# Patient Record
Sex: MALE | Race: WHITE | Employment: FULL TIME | URBAN - METROPOLITAN AREA
[De-identification: names, ages, dates, MRNs, and addresses within clinical notes are randomized per-mention and may not be internally consistent; named-entity substitution may affect disease eponyms.]

---

## 2017-01-09 ENCOUNTER — OFFICE VISIT (OUTPATIENT)
Dept: SURGERY | Facility: CLINIC | Age: 47
End: 2017-01-09
Payer: COMMERCIAL

## 2017-01-09 VITALS
OXYGEN SATURATION: 98 % | SYSTOLIC BLOOD PRESSURE: 140 MMHG | TEMPERATURE: 98.3 F | HEART RATE: 78 BPM | DIASTOLIC BLOOD PRESSURE: 90 MMHG | WEIGHT: 200 LBS | BODY MASS INDEX: 30.31 KG/M2 | HEIGHT: 68 IN

## 2017-01-09 DIAGNOSIS — Z09 SURGICAL FOLLOWUP VISIT: Primary | ICD-10-CM

## 2017-01-09 PROCEDURE — 99024 POSTOP FOLLOW-UP VISIT: CPT | Performed by: PHYSICIAN ASSISTANT

## 2017-01-09 NOTE — PROGRESS NOTES
[facility] 1/9/2017  Subjective:  Jack Thomas is here for his first postoperative visit.  He underwent Umbilical hernia repair with Bard Ventralex ST Hernia Patch by Dr. Bullard.  He is now almost 3 weeks postop, and feels his recovery has been progressing nicely.  Rx/OTC pain medication was used for 2 weeks postop, but he has noticed a dramatic decrease in soreness in the past week.  Postop recovery complications: None.    Today he has minimal discomfort at the repair site with activities, tolerating a regular diet, and having normal bowel activity.  Current pain management: none.  He wonders when he can resume bowling and elliptical exercise.     Objective:  Abd - soft, non-tender, non-distended  Inc - remaining glue removed, healing well, no erythema/bruising, +normal healing ridge, no seroma/hematoma noted, no hernia noted    Assessment:  S/p Umbilical hernia repair with Bard Ventralex ST Hernia Patch; unremarkable recovery.    Plan:  Jack may continue to slowly advance his activities at this time.  General recommendation is to remain at a 20 lb weight restriction until 4 weeks after surgery.  After that time, he may increase weight restriction by 10 lbs per week.  He may continue to utilize OTC pain management options as well as use of ice/heat to site for comfort.  He should expect progressive resolution of the healing ridge along the incisional site over the following 2-3 months.  Elliptical and bowling may slowly be restarted at 4 weeks postop, continuing to adhere to weight limits.    Jack is recommended to contact the office if worsening pain, onset of fever/redness at inc site, or new drainage from the area.  Pt also recommended to call office at any time if ongoing questions/concerns during recovery, but otherwise may follow-up on a prn basis.  Pt is in agreement with this plan.      Kylah Manjarrez PA-C      Please route or send letter to:  Primary Care Provider  (PCP)    HPI      ROS      Physical Exam

## 2017-01-09 NOTE — MR AVS SNAPSHOT
"              After Visit Summary   2017    Jack Thomas    MRN: [id number]           Patient Information     Date Of Birth          1970        Visit Information        Provider Department      2017 3:30 PM Kylah Manjarrez PA-C [facility]      Today's Diagnoses     Surgical followup visit    -  1        Follow-ups after your visit        Follow-up notes from your care team     Return if symptoms worsen or fail to improve.      Who to contact     If you have questions or need follow up information about today's clinic visit or your schedule please contact [facility] directly at [phone].  Normal or non-critical lab and imaging results will be communicated to you by [employer], letter or phone within 4 business days after the clinic has received the results. If you do not hear from us within 7 days, please contact the clinic through [employer] or phone. If you have a critical or abnormal lab result, we will notify you by phone as soon as possible.  Submit refill requests through [facility] or call your pharmacy and they will forward the refill request to us. Please allow 3 business days for your refill to be completed.          Additional Information About Your Visit        MyChart Information     [facility] lets you send messages to your doctor, view your test results, renew your prescriptions, schedule appointments and more. To sign up, go to [email or web address] . Click on \"Log in\" on the left side of the screen, which will take you to the Welcome page. Then click on \"Sign up Now\" on the right side of the page.     You will be asked to enter the access code listed below, as well as some personal information. Please follow the directions to create your username and password.     Your access code is: [email or web address]  Expires: 3/22/2017 11:02 AM     Your access code will  in 90 days. If you need help or a " "new code, please call your [place] clinic or [phone].        Care EveryWhere ID     This is your Care EveryWhere ID. This could be used by other organizations to access your [place] medical records  [id number]        Your Vitals Were     Pulse Temperature Height BMI (Body Mass Index) Pulse Oximetry       78 98.3  F (36.8  C) (Oral) 5' 8\" (1.727 m) 30.42 kg/m2 98%        Blood Pressure from Last 3 Encounters:   01/09/17 140/90   12/22/16 129/77   12/09/16 154/88    Weight from Last 3 Encounters:   01/09/17 200 lb (90.719 kg)   12/22/16 202 lb (91.627 kg)   12/09/16 200 lb (90.719 kg)              Today, you had the following     No orders found for display       Primary Care Provider Office Phone # Fax #    Kyle Koo [phone]       [facility] [address] [address] [facility] [address]        Thank you!     Thank you for choosing [facility]  for your care. Our goal is always to provide you with excellent care. Hearing back from our patients is one way we can continue to improve our services. Please take a few minutes to complete the written survey that you may receive in the mail after your visit with us. Thank you!             Your Updated Medication List - Protect others around you: Learn how to safely use, store and throw away your medicines at www.disposemymeds.org.          This list is accurate as of: 1/9/17  3:49 PM.  Always use your most recent med list.                   Brand Name Dispense Instructions for use    loratadine 10 MG tablet    CLARITIN     Take 10 mg by mouth daily       losartan 100 MG tablet    COZAAR     Take 100 mg by mouth daily       OMEGA-3 FISH OIL PO      Take 1 g by mouth daily       oxyCODONE 5 MG IR tablet    ROXICODONE    30 tablet    Take 1-2 tablets (5-10 mg) by mouth every 3 hours as needed for pain or other (Moderate to Severe)       PROTONIX 40 MG EC tablet   Generic drug:  pantoprazole      Take 40 mg by " mouth daily

## 2017-01-09 NOTE — Clinical Note
[facility] 2017    RE:  Jack Thomas-:  72    Subjective:  Jack Thomas is here for his first postoperative visit.  He underwent Umbilical hernia repair with Bard Ventralex ST Hernia Patch by Dr. Bullard.  He is now almost 3 weeks postop, and feels his recovery has been progressing nicely.  Rx/OTC pain medication was used for 2 weeks postop, but he has noticed a dramatic decrease in soreness in the past week.  Postop recovery complications: None.    Today he has minimal discomfort at the repair site with activities, tolerating a regular diet, and having normal bowel activity.  Current pain management: none.  He wonders when he can resume bowling and elliptical exercise.     Objective:  Abd - soft, non-tender, non-distended  Inc - remaining glue removed, healing well, no erythema/bruising, +normal healing ridge, no seroma/hematoma noted, no hernia noted    Assessment:  S/p Umbilical hernia repair with Bard Ventralex ST Hernia Patch; unremarkable recovery.    Plan:  Jack may continue to slowly advance his activities at this time.  General recommendation is to remain at a 20 lb weight restriction until 4 weeks after surgery.  After that time, he may increase weight restriction by 10 lbs per week.  He may continue to utilize OTC pain management options as well as use of ice/heat to site for comfort.  He should expect progressive resolution of the healing ridge along the incisional site over the following 2-3 months.  Elliptical and bowling may slowly be restarted at 4 weeks postop, continuing to adhere to weight limits.    Jack is recommended to contact the office if worsening pain, onset of fever/redness at inc site, or new drainage from the area.  Pt also recommended to call office at any time if ongoing questions/concerns during recovery, but otherwise may follow-up on a prn basis.  Pt is in agreement with this plan.    Kylah Manjarrez PA-C